# Patient Record
Sex: MALE | Race: WHITE | HISPANIC OR LATINO | Employment: FULL TIME | URBAN - METROPOLITAN AREA
[De-identification: names, ages, dates, MRNs, and addresses within clinical notes are randomized per-mention and may not be internally consistent; named-entity substitution may affect disease eponyms.]

---

## 2020-02-13 ENCOUNTER — APPOINTMENT (EMERGENCY)
Dept: RADIOLOGY | Facility: HOSPITAL | Age: 30
End: 2020-02-13

## 2020-02-13 ENCOUNTER — HOSPITAL ENCOUNTER (OUTPATIENT)
Facility: HOSPITAL | Age: 30
Setting detail: OBSERVATION
Discharge: HOME | End: 2020-02-14
Attending: EMERGENCY MEDICINE | Admitting: FAMILY MEDICINE

## 2020-02-13 DIAGNOSIS — Z72.0 OCCASIONAL CIGARETTE SMOKER: ICD-10-CM

## 2020-02-13 DIAGNOSIS — J45.909 REACTIVE AIRWAY DISEASE WITH WHEEZING: ICD-10-CM

## 2020-02-13 DIAGNOSIS — R06.2 WHEEZING: Primary | ICD-10-CM

## 2020-02-13 LAB
ALBUMIN SERPL BCP-MCNC: 4.2 G/DL (ref 3.5–5)
ALP SERPL-CCNC: 103 U/L (ref 46–116)
ALT SERPL W P-5'-P-CCNC: 70 U/L (ref 12–78)
ANION GAP SERPL CALCULATED.3IONS-SCNC: 8 MMOL/L (ref 4–13)
AST SERPL W P-5'-P-CCNC: 26 U/L (ref 5–45)
BASOPHILS # BLD AUTO: 0.04 THOUSANDS/ΜL (ref 0–0.1)
BASOPHILS NFR BLD AUTO: 0 % (ref 0–1)
BILIRUB SERPL-MCNC: 0.6 MG/DL (ref 0.2–1)
BUN SERPL-MCNC: 8 MG/DL (ref 5–25)
CALCIUM SERPL-MCNC: 9.1 MG/DL (ref 8.3–10.1)
CHLORIDE SERPL-SCNC: 101 MMOL/L (ref 100–108)
CO2 SERPL-SCNC: 28 MMOL/L (ref 21–32)
CREAT SERPL-MCNC: 0.66 MG/DL (ref 0.6–1.3)
EOSINOPHIL # BLD AUTO: 0.19 THOUSAND/ΜL (ref 0–0.61)
EOSINOPHIL NFR BLD AUTO: 2 % (ref 0–6)
ERYTHROCYTE [DISTWIDTH] IN BLOOD BY AUTOMATED COUNT: 12.5 % (ref 11.6–15.1)
FLUAV RNA NPH QL NAA+PROBE: NORMAL
FLUBV RNA NPH QL NAA+PROBE: NORMAL
GFR SERPL CREATININE-BSD FRML MDRD: 130 ML/MIN/1.73SQ M
GLUCOSE SERPL-MCNC: 111 MG/DL (ref 65–140)
HCT VFR BLD AUTO: 44 % (ref 36.5–49.3)
HGB BLD-MCNC: 15.5 G/DL (ref 12–17)
IMM GRANULOCYTES # BLD AUTO: 0.03 THOUSAND/UL (ref 0–0.2)
IMM GRANULOCYTES NFR BLD AUTO: 0 % (ref 0–2)
LYMPHOCYTES # BLD AUTO: 0.66 THOUSANDS/ΜL (ref 0.6–4.47)
LYMPHOCYTES NFR BLD AUTO: 7 % (ref 14–44)
MCH RBC QN AUTO: 32.1 PG (ref 26.8–34.3)
MCHC RBC AUTO-ENTMCNC: 35.2 G/DL (ref 31.4–37.4)
MCV RBC AUTO: 91 FL (ref 82–98)
MONOCYTES # BLD AUTO: 0.36 THOUSAND/ΜL (ref 0.17–1.22)
MONOCYTES NFR BLD AUTO: 4 % (ref 4–12)
NEUTROPHILS # BLD AUTO: 8.51 THOUSANDS/ΜL (ref 1.85–7.62)
NEUTS SEG NFR BLD AUTO: 87 % (ref 43–75)
NRBC BLD AUTO-RTO: 0 /100 WBCS
PLATELET # BLD AUTO: 231 THOUSANDS/UL (ref 149–390)
PMV BLD AUTO: 9.8 FL (ref 8.9–12.7)
POTASSIUM SERPL-SCNC: 3.4 MMOL/L (ref 3.5–5.3)
PROT SERPL-MCNC: 8.3 G/DL (ref 6.4–8.2)
RBC # BLD AUTO: 4.83 MILLION/UL (ref 3.88–5.62)
RSV RNA NPH QL NAA+PROBE: NORMAL
SODIUM SERPL-SCNC: 137 MMOL/L (ref 136–145)
TROPONIN I SERPL-MCNC: <0.02 NG/ML
WBC # BLD AUTO: 9.79 THOUSAND/UL (ref 4.31–10.16)

## 2020-02-13 PROCEDURE — 80053 COMPREHEN METABOLIC PANEL: CPT | Performed by: EMERGENCY MEDICINE

## 2020-02-13 PROCEDURE — 87631 RESP VIRUS 3-5 TARGETS: CPT | Performed by: EMERGENCY MEDICINE

## 2020-02-13 PROCEDURE — 99285 EMERGENCY DEPT VISIT HI MDM: CPT | Performed by: EMERGENCY MEDICINE

## 2020-02-13 PROCEDURE — 99285 EMERGENCY DEPT VISIT HI MDM: CPT

## 2020-02-13 PROCEDURE — 90686 IIV4 VACC NO PRSV 0.5 ML IM: CPT | Performed by: PHYSICIAN ASSISTANT

## 2020-02-13 PROCEDURE — 94644 CONT INHLJ TX 1ST HOUR: CPT

## 2020-02-13 PROCEDURE — 36415 COLL VENOUS BLD VENIPUNCTURE: CPT | Performed by: EMERGENCY MEDICINE

## 2020-02-13 PROCEDURE — 84484 ASSAY OF TROPONIN QUANT: CPT | Performed by: EMERGENCY MEDICINE

## 2020-02-13 PROCEDURE — 85025 COMPLETE CBC W/AUTO DIFF WBC: CPT | Performed by: EMERGENCY MEDICINE

## 2020-02-13 PROCEDURE — 93005 ELECTROCARDIOGRAM TRACING: CPT

## 2020-02-13 PROCEDURE — 90471 IMMUNIZATION ADMIN: CPT | Performed by: PHYSICIAN ASSISTANT

## 2020-02-13 PROCEDURE — 94760 N-INVAS EAR/PLS OXIMETRY 1: CPT

## 2020-02-13 PROCEDURE — 71045 X-RAY EXAM CHEST 1 VIEW: CPT

## 2020-02-13 RX ORDER — SODIUM CHLORIDE FOR INHALATION 0.9 %
3 VIAL, NEBULIZER (ML) INHALATION ONCE
Status: COMPLETED | OUTPATIENT
Start: 2020-02-13 | End: 2020-02-13

## 2020-02-13 RX ORDER — PREDNISONE 20 MG/1
40 TABLET ORAL ONCE
Status: COMPLETED | OUTPATIENT
Start: 2020-02-13 | End: 2020-02-13

## 2020-02-13 RX ORDER — ALBUTEROL SULFATE 2.5 MG/3ML
5 SOLUTION RESPIRATORY (INHALATION) ONCE
Status: COMPLETED | OUTPATIENT
Start: 2020-02-13 | End: 2020-02-13

## 2020-02-13 RX ADMIN — ALBUTEROL SULFATE 10 MG: 2.5 SOLUTION RESPIRATORY (INHALATION) at 17:06

## 2020-02-13 RX ADMIN — PREDNISONE 40 MG: 20 TABLET ORAL at 18:00

## 2020-02-13 RX ADMIN — ISODIUM CHLORIDE 3 ML: 0.03 SOLUTION RESPIRATORY (INHALATION) at 17:07

## 2020-02-13 RX ADMIN — ALBUTEROL SULFATE 5 MG: 2.5 SOLUTION RESPIRATORY (INHALATION) at 16:36

## 2020-02-13 RX ADMIN — IPRATROPIUM BROMIDE 0.5 MG: 0.5 SOLUTION RESPIRATORY (INHALATION) at 16:36

## 2020-02-13 RX ADMIN — IPRATROPIUM BROMIDE 1 MG: 0.5 SOLUTION RESPIRATORY (INHALATION) at 17:07

## 2020-02-13 RX ADMIN — INFLUENZA VIRUS VACCINE 0.5 ML: 15; 15; 15; 15 SUSPENSION INTRAMUSCULAR at 23:29

## 2020-02-13 NOTE — ED PROVIDER NOTES
History  Chief Complaint   Patient presents with    Wheezing     States he started yesterday with cough and runny nose and is taking sudafed  Patient with audible insp/exp wheezing and denies hx of asthma, on questioning states started yesterday  Patient states she was well until yesterday when he suddenly developed multiple flu-like symptoms including congestion, sore throat, cough with chest tightness and fever  Patient states did not get a flu shot this year and thinks that he has the flu  He denies any known sick contacts  States he slipped with the window open the other day because it was hot in his room  Patient smokes occasionally          None       History reviewed  No pertinent past medical history  Past Surgical History:   Procedure Laterality Date    APPENDECTOMY         History reviewed  No pertinent family history  I have reviewed and agree with the history as documented  Social History     Tobacco Use    Smoking status: Current Some Day Smoker     Packs/day: 0 20     Types: Cigarettes    Smokeless tobacco: Never Used   Substance Use Topics    Alcohol use: Yes     Comment: social    Drug use: Yes     Types: Marijuana     Comment: occasional        Review of Systems   Constitutional: Positive for fever  Negative for chills  HENT: Positive for congestion and sore throat  Eyes: Negative for visual disturbance  Respiratory: Positive for cough, chest tightness, shortness of breath and wheezing  Cardiovascular: Positive for chest pain  Negative for palpitations and leg swelling  Gastrointestinal: Negative for abdominal pain and vomiting  Genitourinary: Negative for difficulty urinating and dysuria  Musculoskeletal: Positive for arthralgias and myalgias  Skin: Negative for rash and wound  Neurological: Positive for weakness and headaches  Negative for seizures, syncope, speech difficulty and numbness  Hematological: Does not bruise/bleed easily  Psychiatric/Behavioral: Negative for confusion  All other systems reviewed and are negative  Physical Exam  Physical Exam   Constitutional: He is oriented to person, place, and time  He appears well-developed and well-nourished  HENT:   Head: Normocephalic and atraumatic  Mouth/Throat: Oropharynx is clear and moist    Eyes: Conjunctivae and EOM are normal    Neck: Normal range of motion  Neck supple  Cardiovascular: Normal rate, regular rhythm and normal heart sounds  Pulmonary/Chest: Effort normal  He has wheezes  Diffuse inspiratory and expiratory wheezing, with scattered rhonchi   Abdominal: Soft  Bowel sounds are normal  There is no tenderness  Musculoskeletal: Normal range of motion  He exhibits no edema  Neurological: He is alert and oriented to person, place, and time  Skin: Skin is warm and dry  Capillary refill takes less than 2 seconds  Psychiatric: He has a normal mood and affect  His behavior is normal    Nursing note and vitals reviewed        Vital Signs  ED Triage Vitals [02/13/20 1630]   Temperature Pulse Respirations Blood Pressure SpO2   99 2 °F (37 3 °C) 81 (!) 32 (!) 153/103 99 %      Temp Source Heart Rate Source Patient Position - Orthostatic VS BP Location FiO2 (%)   Tympanic Monitor Sitting Right arm --      Pain Score       8           Vitals:    02/13/20 1900 02/13/20 1930 02/13/20 2000 02/13/20 2015   BP: 124/64 128/74 125/61    Pulse: 94 85 84 81   Patient Position - Orthostatic VS:             Visual Acuity      ED Medications  Medications   albuterol inhalation solution 5 mg (5 mg Nebulization Given 2/13/20 1636)     And   ipratropium (ATROVENT) 0 02 % inhalation solution 0 5 mg (0 5 mg Nebulization Given 2/13/20 1636)   albuterol inhalation solution 10 mg (10 mg Nebulization Given 2/13/20 1706)     And   ipratropium (ATROVENT) 0 02 % inhalation solution 1 mg (1 mg Nebulization Given 2/13/20 1707)     And   sodium chloride 0 9 % inhalation solution 3 mL (3 mL Nebulization Given 2/13/20 1707)   predniSONE tablet 40 mg (40 mg Oral Given 2/13/20 1800)       Diagnostic Studies  Results Reviewed     Procedure Component Value Units Date/Time    Troponin I [[id number]]     Lab Status:  No result Specimen:  Blood     CBC and differential [[id number]]  (Abnormal) Collected:  02/13/20 2027    Lab Status:  Final result Specimen:  Blood from Arm, Left Updated:  02/13/20 2031     WBC 9 79 Thousand/uL      RBC 4 83 Million/uL      Hemoglobin 15 5 g/dL      Hematocrit 44 0 %      MCV 91 fL      MCH 32 1 pg      MCHC 35 2 g/dL      RDW 12 5 %      MPV 9 8 fL      Platelets 454 Thousands/uL      nRBC 0 /100 WBCs      Neutrophils Relative 87 %      Immat GRANS % 0 %      Lymphocytes Relative 7 %      Monocytes Relative 4 %      Eosinophils Relative 2 %      Basophils Relative 0 %      Neutrophils Absolute 8 51 Thousands/µL      Immature Grans Absolute 0 03 Thousand/uL      Lymphocytes Absolute 0 66 Thousands/µL      Monocytes Absolute 0 36 Thousand/µL      Eosinophils Absolute 0 19 Thousand/µL      Basophils Absolute 0 04 Thousands/µL     Comprehensive metabolic panel [[id number]] Collected:  02/13/20 2027    Lab Status:   In process Specimen:  Blood from Arm, Left Updated:  02/13/20 2029    Influenza A/B and RSV PCR [[id number]]  (Normal) Collected:  02/13/20 1639    Lab Status:  Final result Specimen:  Nose Updated:  02/13/20 1725     INFLUENZA A PCR None Detected     INFLUENZA B PCR None Detected     RSV PCR None Detected                 XR chest 1 view portable    (Results Pending)              Procedures  ECG 12 Lead Documentation Only  Date/Time: 2/13/2020 8:29 PM  Performed by: Nelida Infante MD  Authorized by: Nelida Infante MD     Indications / Diagnosis:  Short of breath  ECG reviewed by me, the ED Provider: yes    Patient location:  ED  Interpretation:     Interpretation: abnormal    Rate:     ECG rate:  66    ECG rate assessment: normal    Rhythm:     Rhythm: sinus rhythm Ectopy:     Ectopy: none    QRS:     QRS axis:  Normal    QRS intervals:  Normal  Conduction:     Conduction: normal    ST segments:     ST segments:  Normal  T waves:     T waves: flattening      Flattening:  V6, V5 and aVL             ED Course                               MDM  Number of Diagnoses or Management Options  Diagnosis management comments: Patient has flu-like symptoms has not been vaccinated  Will give nebulizers for wheezing  Patient still wheezing after his 1st nebulizer  Patient given an hour long treatment, with minimal improvement  Peak flow is still around 180  Patient was given steroids and observed  He did not improve much  Peak flow at this time is right about 200 well under expected  Will admit        Disposition  Final diagnoses:   Wheezing     Time reflects when diagnosis was documented in both MDM as applicable and the Disposition within this note     Time User Action Codes Description Comment    2/13/2020  8:42 PM Silvina Flannery Add [R06 2] Wheezing       ED Disposition     ED Disposition Condition Date/Time Comment    Admit Stable Thu Feb 13, 2020  8:42 PM Case was discussed with Ivette Peabody and the patient's admission status was agreed to be Admission Status: observation status to the service of Dr Joan Condon   Follow-up Information    None         Patient's Medications    No medications on file     No discharge procedures on file      PDMP Review     None          ED Provider  Electronically Signed by           Nelida Infante MD  02/13/20 2833

## 2020-02-14 VITALS
TEMPERATURE: 97.1 F | OXYGEN SATURATION: 96 % | SYSTOLIC BLOOD PRESSURE: 131 MMHG | HEIGHT: 68 IN | HEART RATE: 80 BPM | RESPIRATION RATE: 18 BRPM | DIASTOLIC BLOOD PRESSURE: 75 MMHG | BODY MASS INDEX: 24.1 KG/M2 | WEIGHT: 159 LBS

## 2020-02-14 PROBLEM — J45.909 REACTIVE AIRWAY DISEASE WITH WHEEZING: Status: RESOLVED | Noted: 2020-02-13 | Resolved: 2020-02-14

## 2020-02-14 PROBLEM — Z72.0 OCCASIONAL CIGARETTE SMOKER: Status: ACTIVE | Noted: 2020-02-14

## 2020-02-14 LAB
ANION GAP SERPL CALCULATED.3IONS-SCNC: 7 MMOL/L (ref 4–13)
ATRIAL RATE: 66 BPM
BASOPHILS # BLD AUTO: 0.02 THOUSANDS/ΜL (ref 0–0.1)
BASOPHILS NFR BLD AUTO: 0 % (ref 0–1)
BUN SERPL-MCNC: 10 MG/DL (ref 5–25)
CALCIUM SERPL-MCNC: 9.1 MG/DL (ref 8.3–10.1)
CHLORIDE SERPL-SCNC: 101 MMOL/L (ref 100–108)
CO2 SERPL-SCNC: 28 MMOL/L (ref 21–32)
CREAT SERPL-MCNC: 0.76 MG/DL (ref 0.6–1.3)
EOSINOPHIL # BLD AUTO: 0.01 THOUSAND/ΜL (ref 0–0.61)
EOSINOPHIL NFR BLD AUTO: 0 % (ref 0–6)
ERYTHROCYTE [DISTWIDTH] IN BLOOD BY AUTOMATED COUNT: 12.6 % (ref 11.6–15.1)
GFR SERPL CREATININE-BSD FRML MDRD: 122 ML/MIN/1.73SQ M
GLUCOSE SERPL-MCNC: 167 MG/DL (ref 65–140)
HCT VFR BLD AUTO: 44.1 % (ref 36.5–49.3)
HGB BLD-MCNC: 15.1 G/DL (ref 12–17)
IMM GRANULOCYTES # BLD AUTO: 0.02 THOUSAND/UL (ref 0–0.2)
IMM GRANULOCYTES NFR BLD AUTO: 0 % (ref 0–2)
LYMPHOCYTES # BLD AUTO: 0.79 THOUSANDS/ΜL (ref 0.6–4.47)
LYMPHOCYTES NFR BLD AUTO: 13 % (ref 14–44)
MCH RBC QN AUTO: 31.5 PG (ref 26.8–34.3)
MCHC RBC AUTO-ENTMCNC: 34.2 G/DL (ref 31.4–37.4)
MCV RBC AUTO: 92 FL (ref 82–98)
MONOCYTES # BLD AUTO: 0.37 THOUSAND/ΜL (ref 0.17–1.22)
MONOCYTES NFR BLD AUTO: 6 % (ref 4–12)
NEUTROPHILS # BLD AUTO: 4.81 THOUSANDS/ΜL (ref 1.85–7.62)
NEUTS SEG NFR BLD AUTO: 81 % (ref 43–75)
NRBC BLD AUTO-RTO: 0 /100 WBCS
P AXIS: 59 DEGREES
PLATELET # BLD AUTO: 252 THOUSANDS/UL (ref 149–390)
PMV BLD AUTO: 10.1 FL (ref 8.9–12.7)
POTASSIUM SERPL-SCNC: 3.8 MMOL/L (ref 3.5–5.3)
PR INTERVAL: 150 MS
QRS AXIS: 29 DEGREES
QRSD INTERVAL: 88 MS
QT INTERVAL: 364 MS
QTC INTERVAL: 381 MS
RBC # BLD AUTO: 4.79 MILLION/UL (ref 3.88–5.62)
SODIUM SERPL-SCNC: 136 MMOL/L (ref 136–145)
T WAVE AXIS: 53 DEGREES
VENTRICULAR RATE: 66 BPM
WBC # BLD AUTO: 6.02 THOUSAND/UL (ref 4.31–10.16)

## 2020-02-14 PROCEDURE — 94640 AIRWAY INHALATION TREATMENT: CPT

## 2020-02-14 PROCEDURE — 94760 N-INVAS EAR/PLS OXIMETRY 1: CPT

## 2020-02-14 PROCEDURE — 93010 ELECTROCARDIOGRAM REPORT: CPT | Performed by: INTERNAL MEDICINE

## 2020-02-14 PROCEDURE — 99235 HOSP IP/OBS SAME DATE MOD 70: CPT | Performed by: FAMILY MEDICINE

## 2020-02-14 PROCEDURE — 80048 BASIC METABOLIC PNL TOTAL CA: CPT | Performed by: PHYSICIAN ASSISTANT

## 2020-02-14 PROCEDURE — 85025 COMPLETE CBC W/AUTO DIFF WBC: CPT | Performed by: PHYSICIAN ASSISTANT

## 2020-02-14 RX ORDER — ALBUTEROL SULFATE 90 UG/1
2 AEROSOL, METERED RESPIRATORY (INHALATION) EVERY 4 HOURS PRN
Qty: 1 INHALER | Refills: 0 | Status: SHIPPED | OUTPATIENT
Start: 2020-02-14 | End: 2020-03-15

## 2020-02-14 RX ORDER — PREDNISONE 10 MG/1
TABLET ORAL
Qty: 20 TABLET | Refills: 0 | Status: SHIPPED | OUTPATIENT
Start: 2020-02-14

## 2020-02-14 RX ORDER — ALBUTEROL SULFATE 2.5 MG/3ML
2.5 SOLUTION RESPIRATORY (INHALATION) EVERY 4 HOURS
Status: DISCONTINUED | OUTPATIENT
Start: 2020-02-14 | End: 2020-02-14 | Stop reason: HOSPADM

## 2020-02-14 RX ORDER — METHYLPREDNISOLONE SODIUM SUCCINATE 40 MG/ML
40 INJECTION, POWDER, LYOPHILIZED, FOR SOLUTION INTRAMUSCULAR; INTRAVENOUS EVERY 8 HOURS SCHEDULED
Status: DISCONTINUED | OUTPATIENT
Start: 2020-02-14 | End: 2020-02-14 | Stop reason: HOSPADM

## 2020-02-14 RX ORDER — PREDNISONE 20 MG/1
40 TABLET ORAL DAILY
Status: DISCONTINUED | OUTPATIENT
Start: 2020-02-14 | End: 2020-02-14

## 2020-02-14 RX ORDER — POLYETHYLENE GLYCOL 3350 17 G/17G
17 POWDER, FOR SOLUTION ORAL DAILY PRN
Status: DISCONTINUED | OUTPATIENT
Start: 2020-02-14 | End: 2020-02-14 | Stop reason: HOSPADM

## 2020-02-14 RX ORDER — CALCIUM CARBONATE 200(500)MG
1000 TABLET,CHEWABLE ORAL DAILY PRN
Status: DISCONTINUED | OUTPATIENT
Start: 2020-02-14 | End: 2020-02-14 | Stop reason: HOSPADM

## 2020-02-14 RX ORDER — ONDANSETRON 2 MG/ML
4 INJECTION INTRAMUSCULAR; INTRAVENOUS EVERY 6 HOURS PRN
Status: DISCONTINUED | OUTPATIENT
Start: 2020-02-14 | End: 2020-02-14 | Stop reason: HOSPADM

## 2020-02-14 RX ORDER — LORATADINE 10 MG/1
10 TABLET ORAL DAILY
Status: DISCONTINUED | OUTPATIENT
Start: 2020-02-14 | End: 2020-02-14 | Stop reason: HOSPADM

## 2020-02-14 RX ORDER — LORATADINE 10 MG/1
10 TABLET ORAL DAILY
Qty: 20 TABLET | Refills: 0 | Status: SHIPPED | OUTPATIENT
Start: 2020-02-15

## 2020-02-14 RX ADMIN — LORATADINE 10 MG: 10 TABLET ORAL at 09:39

## 2020-02-14 RX ADMIN — ALBUTEROL SULFATE 2.5 MG: 2.5 SOLUTION RESPIRATORY (INHALATION) at 11:25

## 2020-02-14 RX ADMIN — ALBUTEROL SULFATE 2.5 MG: 2.5 SOLUTION RESPIRATORY (INHALATION) at 03:03

## 2020-02-14 RX ADMIN — ALBUTEROL SULFATE 2.5 MG: 2.5 SOLUTION RESPIRATORY (INHALATION) at 16:17

## 2020-02-14 RX ADMIN — ALBUTEROL SULFATE 2.5 MG: 2.5 SOLUTION RESPIRATORY (INHALATION) at 07:50

## 2020-02-14 RX ADMIN — METHYLPREDNISOLONE SODIUM SUCCINATE 40 MG: 40 INJECTION, POWDER, FOR SOLUTION INTRAMUSCULAR; INTRAVENOUS at 14:43

## 2020-02-14 RX ADMIN — PREDNISONE 40 MG: 20 TABLET ORAL at 09:39

## 2020-02-14 NOTE — PLAN OF CARE
Problem: RESPIRATORY - ADULT  Goal: Achieves optimal ventilation and oxygenation  Description  INTERVENTIONS:  - Assess for changes in respiratory status  - Assess for changes in mentation and behavior  - Position to facilitate oxygenation and minimize respiratory effort  - Oxygen administered by appropriate delivery if ordered  - Initiate smoking cessation education as indicated  - Encourage broncho-pulmonary hygiene including cough, deep breathe, Incentive Spirometry  - Assess the need for suctioning and aspirate as needed  - Assess and instruct to report SOB or any respiratory difficulty  - Respiratory Therapy support as indicated  2/14/2020 1055 by Patrizia Miller RN  Outcome: Progressing  2/14/2020 1055 by Patrizia Miller RN  Outcome: Progressing     Problem: PAIN - ADULT  Goal: Verbalizes/displays adequate comfort level or baseline comfort level  Description  Interventions:  - Encourage patient to monitor pain and request assistance  - Assess pain using appropriate pain scale  - Administer analgesics based on type and severity of pain and evaluate response  - Implement non-pharmacological measures as appropriate and evaluate response  - Consider cultural and social influences on pain and pain management  - Notify physician/advanced practitioner if interventions unsuccessful or patient reports new pain  Outcome: Progressing     Problem: INFECTION - ADULT  Goal: Absence or prevention of progression during hospitalization  Description  INTERVENTIONS:  - Assess and monitor for signs and symptoms of infection  - Monitor lab/diagnostic results  - Monitor all insertion sites, i e  indwelling lines, tubes, and drains  - Monitor endotracheal if appropriate and nasal secretions for changes in amount and color  - [place] appropriate cooling/warming therapies per order  - Administer medications as ordered  - Instruct and encourage patient and family to use good hand hygiene technique  - Identify and instruct in appropriate isolation precautions for identified infection/condition  Outcome: Progressing  Goal: Absence of fever/infection during neutropenic period  Description  INTERVENTIONS:  - Monitor WBC    Outcome: Progressing     Problem: SAFETY ADULT  Goal: Patient will remain free of falls  Description  INTERVENTIONS:  - Assess patient frequently for physical needs  -  Identify cognitive and physical deficits and behaviors that affect risk of falls    -  [place] fall precautions as indicated by assessment   - Educate patient/family on patient safety including physical limitations  - Instruct patient to call for assistance with activity based on assessment  - Modify environment to reduce risk of injury  - Consider OT/PT consult to assist with strengthening/mobility  Outcome: Progressing  Goal: Maintain or return to baseline ADL function  Description  INTERVENTIONS:  -  Assess patient's ability to carry out ADLs; assess patient's baseline for ADL function and identify physical deficits which impact ability to perform ADLs (bathing, care of mouth/teeth, toileting, grooming, dressing, etc )  - Assess/evaluate cause of self-care deficits   - Assess range of motion  - Assess patient's mobility; develop plan if impaired  - Assess patient's need for assistive devices and provide as appropriate  - Encourage maximum independence but intervene and supervise when necessary  - Involve family in performance of ADLs  - Assess for home care needs following discharge   - Consider OT consult to assist with ADL evaluation and planning for discharge  - Provide patient education as appropriate  Outcome: Progressing  Goal: Maintain or return mobility status to optimal level  Description  INTERVENTIONS:  - Assess patient's baseline mobility status (ambulation, transfers, stairs, etc )    - Identify cognitive and physical deficits and behaviors that affect mobility  - Identify mobility aids required to assist with transfers and/or ambulation (gait belt, sit-to-stand, lift, walker, cane, etc )  - [place] fall precautions as indicated by assessment  - Record patient progress and toleration of activity level on Mobility SBAR; progress patient to next Phase/Stage  - Instruct patient to call for assistance with activity based on assessment  - Consider rehabilitation consult to assist with strengthening/weightbearing, etc   Outcome: Progressing     Problem: DISCHARGE PLANNING  Goal: Discharge to home or other facility with appropriate resources  Description  INTERVENTIONS:  - Identify barriers to discharge w/patient and caregiver  - Arrange for needed discharge resources and transportation as appropriate  - Identify discharge learning needs (meds, wound care, etc )  - Arrange for interpretive services to assist at discharge as needed  - Refer to Case Management Department for coordinating discharge planning if the patient needs post-hospital services based on physician/advanced practitioner order or complex needs related to functional status, cognitive ability, or social support system  Outcome: Progressing     Problem: Knowledge Deficit  Goal: Patient/family/caregiver demonstrates understanding of disease process, treatment plan, medications, and discharge instructions  Description  Complete learning assessment and assess knowledge base    Interventions:  - Provide teaching at level of understanding  - Provide teaching via preferred learning methods  Outcome: Progressing

## 2020-02-14 NOTE — ASSESSMENT & PLAN NOTE
Patient says he only smokes 2-3 cigarettes per day, however he does vape and occasionally smokes marijuana

## 2020-02-14 NOTE — NURSING NOTE
Pt discharged via walking per pt request with SO  IV removed, AVS discussed with pt, smoking cessation materials given in Romanian per pt's preferred language  Discussed with pt importance of smoking cessation and dangers our [employer]  Pt does not have insurance currently, gave pt information on [employer] for discounted prescriptions, [employer] for online application for state insurance  Reinforced with pt when to take next medication dose and importance of continuing the prescribed medication  Pt verbalized understanding  All questions asked were answered

## 2020-02-14 NOTE — UTILIZATION REVIEW
Initial Clinical Review    Admission: Date/Time/Statement: Admission Orders (From admission, onward)     Ordered        02/13/20 2043  Place in Observation  Once                   Orders Placed This Encounter   Procedures    Place in Observation     Standing Status:   Standing     Number of Occurrences:   1     Order Specific Question:   Admitting Physician     Answer:   Abdoulaye Reynoso     Order Specific Question:   Level of Care     Answer:   Med Surg [16]     ED Arrival Information     Expected Arrival Acuity Means of Arrival Escorted By Service Admission Type    - 2/13/2020 16:10 Urgent Walk-In Friend Hospitalist Urgent    Arrival Complaint    Cough; Cold Symptoms        Chief Complaint   Patient presents with    Wheezing     States he started yesterday with cough and runny nose and is taking sudafed  Patient with audible insp/exp wheezing and denies hx of asthma, on questioning states started yesterday  Assessment/Plan: 26 y o  [place] ED from home   with no past medical history who presented to the ED with congestion, sore throat, cough, chest tightness and fever  Patient stated at girlfriend  has cats and feels he is allergic to cats  Today symptoms still present  In ed pt had hour long nebs with  Minimal improvement , diffuse inspiratory and expiratory wheezing throughout all lung fields  Will admit observation continue nebs, steroids and add claritn   Reactive airway disease with wheezing  ED Triage Vitals [02/13/20 1630]   Temperature Pulse Respirations Blood Pressure SpO2   99 2 °F (37 3 °C) 81 (!) 32 (!) 153/103 99 %      Temp Source Heart Rate Source Patient Position - Orthostatic VS BP Location FiO2 (%)   Tympanic Monitor Sitting Right arm --      Pain Score       8        Wt Readings from Last 1 Encounters:   02/13/20 72 1 kg (159 lb)     Additional Vital Signs:   /13/20 2146  97 4 °F (36 3 °C)Abnormal   73  22  121/66    97 %  None (Room air)  Lying   02/13/20 2100    79  19  120/70 [address]   02/13/20 2045    79  30Abnormal              02/13/20 2030    65  25Abnormal   106/61  75         02/13/20 2015    81  19             02/13/20 2000    84  15  125/61  85  94 %       02/13/20 1930    85  15  128/74  95  94 %       02/13/20 1900    94  25Abnormal   124/64  88  95 %       02/13/20 1845    95  23Abnormal       96 %           Pertinent Labs/Diagnostic Test Results:   cxr No acute cardiopulmonary disease    Results from last 7 days   Lab Units 02/14/20  0600 02/13/20 2027   WBC Thousand/uL 6 02 9 79   HEMOGLOBIN g/dL 15 1 15 5   HEMATOCRIT % 44 1 44 0   PLATELETS Thousands/uL 252 231   NEUTROS ABS Thousands/µL 4 81 8 51*     Results from last 7 days   Lab Units 02/14/20  0600 02/13/20 2027   SODIUM mmol/L 136 137   POTASSIUM mmol/L 3 8 3 4*   CHLORIDE mmol/L 101 101   CO2 mmol/L 28 28   ANION GAP mmol/L 7 8   BUN mg/dL 10 8   CREATININE mg/dL 0 76 0 66   EGFR ml/min/1 73sq m 122 130   CALCIUM mg/dL 9 1 9 1     Results from last 7 days   Lab Units 02/13/20 2027   AST U/L 26   ALT U/L 70   ALK PHOS U/L 103   TOTAL PROTEIN g/dL 8 3*   ALBUMIN g/dL 4 2   TOTAL BILIRUBIN mg/dL 0 60     Results from last 7 days   Lab Units 02/14/20  0600 02/13/20 2027   GLUCOSE RANDOM mg/dL 167* 111     Results from last 7 days   Lab Units 02/13/20 2027   TROPONIN I ng/mL <0 02     Results from last 7 days   Lab Units 02/13/20  1639   INFLUENZA A PCR  None Detected   INFLUENZA B PCR  None Detected   RSV PCR  None Detected     ED Treatment:   Medication Administration from 02/13/2020 1609 to 02/13/2020 2118       Date/Time Order Dose Route Action Action by Comments     02/13/2020 1636 albuterol inhalation solution 5 mg 5 mg Nebulization Given Wanda Polanco RN      02/13/2020 1636 ipratropium (ATROVENT) 0 02 % inhalation solution 0 5 mg 0 5 mg Nebulization Given Wanda Polanco RN      02/13/2020 1706 albuterol inhalation solution 10 mg 10 mg Nebulization Given Mikayla Rader, RT 02/13/2020 1707 ipratropium (ATROVENT) 0 02 % inhalation solution 1 mg 1 mg Nebulization Given Subha Peon, RT      02/13/2020 1707 sodium chloride 0 9 % inhalation solution 3 mL 3 mL Nebulization Given Subha Peon, RT      02/13/2020 1800 predniSONE tablet 40 mg 40 mg Oral Given Delmis Genao RN         History reviewed  No pertinent past medical history  Present on Admission:   Reactive airway disease with wheezing      Admitting Diagnosis: Wheezing [R06 2]  Age/Sex: 26 y o  male  Admission Orders:  Peak flow  Scheduled Medications:    Medications:  albuterol 2 5 mg Nebulization Q4H   loratadine 10 mg Oral Daily   predniSONE 40 mg Oral Daily     Continuous IV Infusions:     PRN Meds:    calcium carbonate 1,000 mg Oral Daily PRN   ondansetron 4 mg Intravenous Q6H PRN   polyethylene glycol 17 g Oral Daily PRN       None    Network Utilization Review Department  [email or web address]  ATTENTION: Please call with any questions or concerns to [phone] and carefully listen to the prompts so that you are directed to the right person  All voicemails are confidential   Chrystine Can all requests for admission clinical reviews, approved or denied determinations and any other requests to dedicated fax number below belonging to the campus where the patient is receiving treatment   List of dedicated fax numbers for the Facilities:  [address] [facility] [address] DENIALS (Administrative/Medical Necessity) [phone]   [address] (Maternity/NICU/Pediatrics) [phone]   [facility] [phone]   [facility] [phone]   Juan Manuel Medina [phone]   [facility] [address] [facility] [phone]   [facility]  [phone]   [address] [facility]  [address] [facility] [address] [place] [address] Edwina Merlin [phone]

## 2020-02-14 NOTE — RESPIRATORY THERAPY NOTE
RT Protocol Note  Giovanni Wilkerson 26 y o  male MRN: [id number]  Unit/Bed#: [address] [place] [address] Encounter: [id number]    Assessment    Principal Problem:    Reactive airway disease with wheezing  Active Problems:    Occasional cigarette smoker      Home Pulmonary Medications:    History reviewed  No pertinent past medical history  Social History     Socioeconomic History    Marital status: Single     Spouse name: None    Number of children: None    Years of education: None    Highest education level: None   Occupational History    None   Social Needs    Financial resource strain: None    Food insecurity:     Worry: None     Inability: None    Transportation needs:     Medical: None     Non-medical: None   Tobacco Use    Smoking status: Current Some Day Smoker     Packs/day: 0 20     Types: Cigarettes    Smokeless tobacco: Never Used   Substance and Sexual Activity    Alcohol use: Yes     Alcohol/week: 6 0 standard drinks     Types: 6 Cans of beer per week     Frequency: 2-4 times a month     Drinks per session: 5 or 6     Binge frequency: Weekly     Comment: social    Drug use: Yes     Types: Marijuana     Comment: occasional     Sexual activity: None   Lifestyle    Physical activity:     Days per week: None     Minutes per session: None    Stress: None   Relationships    Social connections:     Talks on phone: None     Gets together: None     Attends Christianity service: None     Active member of club or organization: None     Attends meetings of clubs or organizations: None     Relationship status: None    Intimate partner violence:     Fear of current or ex partner: None     Emotionally abused: None     Physically abused: None     Forced sexual activity: None   Other Topics Concern    None   Social History Narrative    None       Subjective    No observed distress,exp wheezes, Resp rate 16, Spo2 97% on room air    Pt states he feels better  Objective    Physical Exam:   Assessment Type: Pre-treatment  General Appearance: Alert, Awake  Respiratory Pattern: Normal, Spontaneous  Chest Assessment: Chest expansion symmetrical, Trachea midline  Bilateral Breath Sounds: Expiratory wheezes    Vitals:  Blood pressure 131/75, pulse 81, temperature 98 3 °F (36 8 °C), temperature source Oral, resp  rate 14, height 5' 8" (1 727 m), weight 72 1 kg (159 lb), SpO2 98 %            Imaging and other studies:           Plan    Respiratory Plan: Mild Distress pathway      Continue pt on albuterol Q4

## 2020-02-14 NOTE — ASSESSMENT & PLAN NOTE
Patient report he was at his girlfriend's house who has cats, and he believes he is allergic to the cats because at that time he started with congestion, itchy, watery eyes  He said he had slight difficulty breathing then, which improved after leaving her house  However he continued with wheezing the following day and continued to have difficulty breathing, tried taking Sudafed with no improvement    He was concern for flu, and did receive flu vaccination in ED   - he has no history of asthma, COPD  -smokes 2-3 cigarettes per week, occasional e-cigarette and marijuana use  - chest x-ray showed no acute cardiopulmonary disease  - flu and RSV negative  - peak flow in the ED initially 180  - received an hour long neb treatment in the ED, with minimal improvement wheezing  - received 1 dose of 40 mg of prednisone as well and repeat peak flow was about 200  - diffuse inspiratory and expiratory wheezing throughout all lung fields  - will continue nebulizer treatments overnight, prednisone, and add Claritin  - most likely triggered by viral URI

## 2020-02-14 NOTE — DISCHARGE SUMMARY
Discharge Summary - [facility]    Patient Information: Rahul Logan 26 y o  male MRN: [id number]  Unit/Bed#: [address] [address] Encounter: [id number]    Discharging Physician / Practitioner: Cassie Stevens DO  PCP: No primary care provider on file  Admission Date: 2/13/2020  Discharge Date: 02/14/20    Reason for Admission: Wheezing (States he started yesterday with cough and runny nose and is taking sudafed  Patient with audible insp/exp wheezing and denies hx of asthma, on questioning states started yesterday  )      Discharge Diagnoses:     Principal Problem (Resolved):    Reactive airway disease with wheezing  Active Problems:    Occasional cigarette smoker        * Reactive airway disease with wheezingresolved as of 2/14/2020  Assessment & Plan  Patient report he was at his girlfriend's house who has cats, and he believes he is allergic to the cats because at that time he started with congestion, itchy, watery eyes  He said he had slight difficulty breathing then, which improved after leaving her house  However he continued with wheezing the following day and continued to have difficulty breathing, tried taking Sudafed with no improvement  He was concern for flu, and did receive flu vaccination in ED   - he has no history of asthma, COPD  - chest x-ray showed no acute cardiopulmonary disease  - flu and RSV negative  - peak flow in the ED initially 180  - received an hour long neb treatment in the ED, with minimal improvement wheezing  - received 1 dose of 40 mg of prednisone as well and repeat peak flow was about 200  Symptoms improved and patient transitioned from IV Solu-Medrol to prednisone taper on discharge  Rx for albuterol inhaler p r n  Also provided  Patient advised to use the albuterol inhaler 4 times a day for the next day or 2 and then p r n   Basis    Occasional cigarette smoker  Assessment & Plan  Patient says he only smokes 2-3 cigarettes per day, however he does vape and occasionally smokes marijuana  Smoking cessation, nicotine patch        Consultations During Hospital Stay:  None    Procedures Performed:     · none    Significant Findings:     · Refer to hospital course and above listed diagnosis related plan for details    Imaging while in hospital:    Xr Chest 1 View Portable    Result Date: 2/14/2020  Narrative: CHEST INDICATION:   wheezing  COMPARISON:  None EXAM PERFORMED/VIEWS:  XR CHEST PORTABLE FINDINGS: Cardiomediastinal silhouette appears unremarkable  The lungs are clear  There is a 3 mm calcified density at the mid right lung likely representing a granuloma  No pneumothorax or pleural effusion  Osseous structures appear within normal limits for patient age  Impression: No acute cardiopulmonary disease  Workstation performed: [id number]       Incidental Findings:   · none    Test Results Pending at Discharge (will require follow up):   · As per After Visit Summary     Outpatient Tests Requested:  · none    Complications:  Refer to hospital course and above listed diagnosis related plan, if any    Hospital Course:     Belén Greenwood is a 26 y o  male patient who originally presented to the hospital on 2/13/2020 due to congestion, cough, chest tightness and wheezing  His girlfriend has cats and the patient was concerned that he was allergic to the cats as his symptoms started developing when he was there  In the ER patient was noted to have diffuse wheezing and was admitted as his symptoms are not improved despite treatment in the ER  Patient was admitted and started on prednisone initially and then transitioned to IV Solu-Medrol  He was continued on nebulizer treatments with improvement in symptoms  Discharge plan was discussed with patient    Please see above list of diagnoses and related plan for additional information  Condition at Discharge: stable     Discharge Day Visit / Exam:     Subjective:  Denies any shortness of breath, wheezing    Feels well and wants to go home    Vitals: Blood Pressure: 131/75 (02/14/20 1558)  Pulse: 80 (02/14/20 1558)  Temperature: (!) 97 1 °F (36 2 °C) (02/14/20 1558)  Temp Source: Tympanic (02/14/20 1558)  Respirations: 18 (02/14/20 1558)  Height: 5' 8" (172 7 cm) (02/13/20 2129)  Weight - Scale: 72 1 kg (159 lb) (02/13/20 1630)  SpO2: 96 % (02/14/20 1618)  Exam:   Physical Exam   Constitutional: He is oriented to person, place, and time  He appears well-developed and well-nourished  No distress  HENT:   Head: Normocephalic and atraumatic  Eyes: EOM are normal  Right eye exhibits no discharge  Left eye exhibits no discharge  No scleral icterus  Cardiovascular: Normal rate and regular rhythm  Pulmonary/Chest: Effort normal and breath sounds normal  No respiratory distress  He has no wheezes  He has no rales  Abdominal: Soft  Bowel sounds are normal  He exhibits no distension  There is no tenderness  Musculoskeletal: He exhibits no edema  Neurological: He is alert and oriented to person, place, and time  No cranial nerve deficit  Skin: He is not diaphoretic  Psychiatric: He has a normal mood and affect  Discharge instructions/Information to patient and family:(Discharge Medications and Follow up):   See after visit summary for information provided to patient and family  Provisions for Follow-Up Care:  See after visit summary for information related to follow-up care and any pertinent home health orders  Disposition: Home    Planned Readmission:  No     Discharge Statement:  I spent 25 minutes discharging the patient  This time was spent on the day of discharge  I had direct contact with the patient on the day of discharge  Greater than 50% of the total time was spent examining patient, answering all patient questions, arranging and discussing plan of care with patient as well as directly providing post-discharge instructions  Additional time then spent on discharge activities      Discharge Medications:  See after visit summary for reconciled discharge medications provided to patient and family  ** Please Note: "This note has been constructed using a voice recognition system  Therefore there may be syntax, spelling, and/or grammatical errors   Please call if you have any questions  "**

## 2020-02-14 NOTE — H&P
[facility]  H&P- Lee Caraballo 1990, 26 y o  male MRN: [id number]  Unit/Bed#: [address] [place] [address] Encounter: [id number]  Primary Care Provider: No primary care provider on file  Date and time admitted to hospital: 2/13/2020  4:21 PM    * Reactive airway disease with wheezing  Assessment & Plan  Patient report he was at his girlfriend's house who has cats, and he believes he is allergic to the cats because at that time he started with congestion, itchy, watery eyes  He said he had slight difficulty breathing then, which improved after leaving her house  However he continued with wheezing the following day and continued to have difficulty breathing, tried taking Sudafed with no improvement  He was concern for flu, and did receive flu vaccination in ED   - he has no history of asthma, COPD  -smokes 2-3 cigarettes per week, occasional e-cigarette and marijuana use  - chest x-ray showed no acute cardiopulmonary disease  - flu and RSV negative  - peak flow in the ED initially 180  - received an hour long neb treatment in the ED, with minimal improvement wheezing  - received 1 dose of 40 mg of prednisone as well and repeat peak flow was about 200  - diffuse inspiratory and expiratory wheezing throughout all lung fields  - will continue nebulizer treatments overnight, prednisone, and add Claritin  - most likely triggered by viral URI    Occasional cigarette smoker  Assessment & Plan  Patient says he only smokes 2-3 cigarettes per day, however he does vape and occasionally smokes marijuana        VTE Prophylaxis: Pharmacologic VTE Prophylaxis contraindicated due to Low risk  / reason for no mechanical VTE prophylaxis Low risk   Code Status:  Level 1  POLST: POLST is not applicable to this patient  Discussion with family:  None    Anticipated Length of Stay:  Patient will be admitted on an Observation basis with an anticipated length of stay of  < 2 midnights     Justification for Hospital Stay: Diffuse wheezing with dyspnea and P flows less than expected between 180 and 200    Total Time for Visit, including Counseling / Coordination of Care: 20 minutes  Greater than 50% of this total time spent on direct patient counseling and coordination of care  Chief Complaint:   Wheezing    History of Present Illness:    Salome Mccartney is a 26 y o  male  with no past medical history who presented to the ED with congestion, sore throat, cough, chest tightness and fever  He reports that the symptoms started yesterday after spending time at his girlfriend's house  His girlfriend does have cats and he is concerned he is allergic to the cats as his symptoms started developing when he was there with wheezing, cough, difficulty breathing, watery, itchy eyes  He does also complain of sore throat, and felt feverish yesterday  His cough is nonproductive  He denies any recent sick contacts  He did not get the flu shot this year, is concerned he has flu  He is an occasional cigarette smoker 2-3 cigarettes in a week, does a and occasionally smoke marijuana  He denies other complaints such as chest pain, nausea, vomiting, lightheadedness, dizziness  Review of Systems:    Review of Systems   Constitutional: Positive for chills and fever  HENT: Positive for congestion, rhinorrhea, sneezing and sore throat  Negative for sinus pressure  Eyes: Negative for visual disturbance  Respiratory: Positive for cough, chest tightness, shortness of breath and wheezing  Cardiovascular: Negative for chest pain, palpitations and leg swelling  Gastrointestinal: Negative for abdominal pain, diarrhea, nausea and vomiting  Genitourinary: Negative for frequency and urgency  Musculoskeletal: Negative for myalgias  Skin: Negative for rash  Neurological: Negative for dizziness, weakness, light-headedness, numbness and headaches  Psychiatric/Behavioral: The patient is not nervous/anxious          Past Medical and Surgical History:     History reviewed  No pertinent past medical history  Past Surgical History:   Procedure Laterality Date    APPENDECTOMY         Meds/Allergies:    Prior to Admission medications    Not on File     Patient does not take any medications at home    Allergies: No Known Allergies    Social History:     Marital Status: Single   Occupation:  Works in kitchen  Patient Pre-hospital Living Situation:  Home  Patient Pre-hospital Level of Mobility:  Independent  Patient Pre-hospital Diet Restrictions:  None  Substance Use History:   Social History     Substance and Sexual Activity   Alcohol Use Yes    Alcohol/week: 6 0 standard drinks    Types: 6 Cans of beer per week    Frequency: 2-4 times a month    Drinks per session: 5 or 6    Binge frequency: Weekly    Comment: social     Social History     Tobacco Use   Smoking Status Current Some Day Smoker    Packs/day: 0 20    Types: Cigarettes   Smokeless Tobacco Never Used     Social History     Substance and Sexual Activity   Drug Use Yes    Types: Marijuana    Comment: occasional        Family History:    Patient unsure of family history  Physical Exam:     Vitals:   Blood Pressure: 131/75 (02/13/20 2324)  Pulse: 81 (02/13/20 2324)  Temperature: 98 3 °F (36 8 °C) (02/13/20 2324)  Temp Source: Oral (02/13/20 2324)  Respirations: 14 (02/13/20 2324)  Height: 5' 8" (172 7 cm) (02/13/20 2129)  Weight - Scale: 72 1 kg (159 lb) (02/13/20 1630)  SpO2: 98 % (02/14/20 0303)    Physical Exam   Constitutional: He is oriented to person, place, and time  He appears well-developed and well-nourished  No distress  HENT:   Head: Normocephalic and atraumatic  Eyes: Pupils are equal, round, and reactive to light  EOM are normal    Cardiovascular: Normal rate, regular rhythm, normal heart sounds and intact distal pulses  Exam reveals no friction rub  No murmur heard  Pulmonary/Chest: Effort normal  No respiratory distress   He has wheezes (Wheezes throughout all lung fields, inspiratory and expiratory)  He has no rales  Abdominal: Soft  Bowel sounds are normal  He exhibits no distension  There is no tenderness  There is no guarding  Musculoskeletal: He exhibits no edema or tenderness  Lymphadenopathy:     He has no cervical adenopathy  Neurological: He is alert and oriented to person, place, and time  Skin: Skin is warm and dry  Capillary refill takes less than 2 seconds  No rash noted  He is not diaphoretic  No erythema  Psychiatric: He has a normal mood and affect  Vitals reviewed  Additional Data:     Lab Results: I have personally reviewed pertinent reports  Results from last 7 days   Lab Units 02/13/20 2027   WBC Thousand/uL 9 79   HEMOGLOBIN g/dL 15 5   HEMATOCRIT % 44 0   PLATELETS Thousands/uL 231   NEUTROS PCT % 87*   LYMPHS PCT % 7*   MONOS PCT % 4   EOS PCT % 2     Results from last 7 days   Lab Units 02/13/20 2027   SODIUM mmol/L 137   POTASSIUM mmol/L 3 4*   CHLORIDE mmol/L 101   CO2 mmol/L 28   BUN mg/dL 8   CREATININE mg/dL 0 66   ANION GAP mmol/L 8   CALCIUM mg/dL 9 1   ALBUMIN g/dL 4 2   TOTAL BILIRUBIN mg/dL 0 60   ALK PHOS U/L 103   ALT U/L 70   AST U/L 26   GLUCOSE RANDOM mg/dL 111                       Imaging: I have personally reviewed pertinent reports  XR chest 1 view portable   Final Result by Aurora Brand MD (02/14 3522)      No acute cardiopulmonary disease  Workstation performed: [id number]             EKG, Pathology, and Other Studies Reviewed on Admission:   · EKG:  Normal sinus rhythm, possible T-wave flattening in V6 V5, no previous EKG to compare    Allscripts / Epic Records Reviewed: Yes     ** Please Note: This note has been constructed using a voice recognition system   **

## 2020-02-15 NOTE — ASSESSMENT & PLAN NOTE
Patient says he only smokes 2-3 cigarettes per day, however he does vape and occasionally smokes marijuana  Smoking cessation, nicotine patch

## 2020-02-15 NOTE — ASSESSMENT & PLAN NOTE
Patient report he was at his girlfriend's house who has cats, and he believes he is allergic to the cats because at that time he started with congestion, itchy, watery eyes  He said he had slight difficulty breathing then, which improved after leaving her house  However he continued with wheezing the following day and continued to have difficulty breathing, tried taking Sudafed with no improvement  He was concern for flu, and did receive flu vaccination in ED   - he has no history of asthma, COPD  - chest x-ray showed no acute cardiopulmonary disease  - flu and RSV negative  - peak flow in the ED initially 180  - received an hour long neb treatment in the ED, with minimal improvement wheezing  - received 1 dose of 40 mg of prednisone as well and repeat peak flow was about 200  Symptoms improved and patient transitioned from IV Solu-Medrol to prednisone taper on discharge  Rx for albuterol inhaler p r n  Also provided  Patient advised to use the albuterol inhaler 4 times a day for the next day or 2 and then p r n   Basis

## 2020-11-16 ENCOUNTER — NURSE TRIAGE (OUTPATIENT)
Dept: OTHER | Facility: OTHER | Age: 30
End: 2020-11-16

## 2020-11-16 DIAGNOSIS — Z20.822 SUSPECTED SEVERE ACUTE RESPIRATORY SYNDROME CORONAVIRUS 2 (SARS-COV-2) INFECTION: Primary | ICD-10-CM

## 2020-11-17 ENCOUNTER — TELEMEDICINE (OUTPATIENT)
Dept: FAMILY MEDICINE CLINIC | Facility: CLINIC | Age: 30
End: 2020-11-17
Payer: OTHER GOVERNMENT

## 2020-11-17 DIAGNOSIS — R51.9 ACUTE NONINTRACTABLE HEADACHE, UNSPECIFIED HEADACHE TYPE: ICD-10-CM

## 2020-11-17 DIAGNOSIS — M79.10 MYALGIA: Primary | ICD-10-CM

## 2020-11-17 DIAGNOSIS — Z20.822 COVID-19 RULED OUT BY LABORATORY TESTING: ICD-10-CM

## 2020-11-17 DIAGNOSIS — Z20.822 SUSPECTED SEVERE ACUTE RESPIRATORY SYNDROME CORONAVIRUS 2 (SARS-COV-2) INFECTION: ICD-10-CM

## 2020-11-17 PROCEDURE — U0003 INFECTIOUS AGENT DETECTION BY NUCLEIC ACID (DNA OR RNA); SEVERE ACUTE RESPIRATORY SYNDROME CORONAVIRUS 2 (SARS-COV-2) (CORONAVIRUS DISEASE [COVID-19]), AMPLIFIED PROBE TECHNIQUE, MAKING USE OF HIGH THROUGHPUT TECHNOLOGIES AS DESCRIBED BY CMS-2020-01-R: HCPCS | Performed by: FAMILY MEDICINE

## 2020-11-17 PROCEDURE — 99213 OFFICE O/P EST LOW 20 MIN: CPT | Performed by: FAMILY MEDICINE

## 2020-11-19 ENCOUNTER — TELEPHONE (OUTPATIENT)
Dept: UROLOGY | Facility: CLINIC | Age: 30
End: 2020-11-19

## 2020-11-19 LAB — SARS-COV-2 RNA SPEC QL NAA+PROBE: DETECTED

## 2020-11-23 ENCOUNTER — TELEMEDICINE (OUTPATIENT)
Dept: FAMILY MEDICINE CLINIC | Facility: CLINIC | Age: 30
End: 2020-11-23
Payer: OTHER GOVERNMENT

## 2020-11-23 ENCOUNTER — TELEPHONE (OUTPATIENT)
Dept: FAMILY MEDICINE CLINIC | Facility: CLINIC | Age: 30
End: 2020-11-23

## 2020-11-23 DIAGNOSIS — U07.1 COVID-19 VIRUS DETECTED: Primary | ICD-10-CM

## 2020-11-23 PROCEDURE — 99213 OFFICE O/P EST LOW 20 MIN: CPT | Performed by: FAMILY MEDICINE

## 2023-08-29 NOTE — ED NOTES
Inspiratory and expiratory wheezing continues to be heard throughout lung fields  Peak flow 240  Dr Brandi White updated       Abdi Benavidez, RN  02/13/20 2003
Pt  Placed on cardiac monitor and hour long nebulizer initiated     Fuentes Thompson RN  02/13/20 0894
yes